# Patient Record
Sex: FEMALE | Race: WHITE | NOT HISPANIC OR LATINO | Employment: FULL TIME | URBAN - METROPOLITAN AREA
[De-identification: names, ages, dates, MRNs, and addresses within clinical notes are randomized per-mention and may not be internally consistent; named-entity substitution may affect disease eponyms.]

---

## 2018-08-20 ENCOUNTER — HOSPITAL ENCOUNTER (OUTPATIENT)
Facility: CLINIC | Age: 48
Discharge: HOME | End: 2018-08-20
Attending: COLON & RECTAL SURGERY | Admitting: COLON & RECTAL SURGERY
Payer: COMMERCIAL

## 2018-08-20 VITALS
RESPIRATION RATE: 14 BRPM | WEIGHT: 144 LBS | DIASTOLIC BLOOD PRESSURE: 55 MMHG | BODY MASS INDEX: 25.52 KG/M2 | HEIGHT: 63 IN | OXYGEN SATURATION: 100 % | SYSTOLIC BLOOD PRESSURE: 99 MMHG

## 2018-08-20 LAB — COLONOSCOPY: NORMAL

## 2018-08-20 PROCEDURE — 25000128 H RX IP 250 OP 636: Performed by: COLON & RECTAL SURGERY

## 2018-08-20 PROCEDURE — 45385 COLONOSCOPY W/LESION REMOVAL: CPT | Mod: PT | Performed by: COLON & RECTAL SURGERY

## 2018-08-20 PROCEDURE — 88305 TISSUE EXAM BY PATHOLOGIST: CPT | Mod: 26 | Performed by: COLON & RECTAL SURGERY

## 2018-08-20 PROCEDURE — 99153 MOD SED SAME PHYS/QHP EA: CPT | Performed by: COLON & RECTAL SURGERY

## 2018-08-20 PROCEDURE — G0500 MOD SEDAT ENDO SERVICE >5YRS: HCPCS | Performed by: COLON & RECTAL SURGERY

## 2018-08-20 PROCEDURE — 88305 TISSUE EXAM BY PATHOLOGIST: CPT | Performed by: COLON & RECTAL SURGERY

## 2018-08-20 RX ORDER — FENTANYL CITRATE 50 UG/ML
INJECTION, SOLUTION INTRAMUSCULAR; INTRAVENOUS PRN
Status: DISCONTINUED | OUTPATIENT
Start: 2018-08-20 | End: 2018-08-20 | Stop reason: HOSPADM

## 2018-08-20 RX ORDER — FLUMAZENIL 0.1 MG/ML
0.2 INJECTION, SOLUTION INTRAVENOUS
Status: DISCONTINUED | OUTPATIENT
Start: 2018-08-20 | End: 2018-08-20 | Stop reason: HOSPADM

## 2018-08-20 RX ORDER — ONDANSETRON 2 MG/ML
4 INJECTION INTRAMUSCULAR; INTRAVENOUS
Status: DISCONTINUED | OUTPATIENT
Start: 2018-08-20 | End: 2018-08-20 | Stop reason: HOSPADM

## 2018-08-20 RX ORDER — ONDANSETRON 2 MG/ML
4 INJECTION INTRAMUSCULAR; INTRAVENOUS EVERY 6 HOURS PRN
Status: DISCONTINUED | OUTPATIENT
Start: 2018-08-20 | End: 2018-08-20 | Stop reason: HOSPADM

## 2018-08-20 RX ORDER — ONDANSETRON 4 MG/1
4 TABLET, ORALLY DISINTEGRATING ORAL EVERY 6 HOURS PRN
Status: DISCONTINUED | OUTPATIENT
Start: 2018-08-20 | End: 2018-08-20 | Stop reason: HOSPADM

## 2018-08-20 RX ORDER — LIDOCAINE 40 MG/G
CREAM TOPICAL
Status: DISCONTINUED | OUTPATIENT
Start: 2018-08-20 | End: 2018-08-20 | Stop reason: HOSPADM

## 2018-08-20 RX ORDER — NALOXONE HYDROCHLORIDE 0.4 MG/ML
.1-.4 INJECTION, SOLUTION INTRAMUSCULAR; INTRAVENOUS; SUBCUTANEOUS
Status: DISCONTINUED | OUTPATIENT
Start: 2018-08-20 | End: 2018-08-20 | Stop reason: HOSPADM

## 2018-08-20 NOTE — DISCHARGE INSTRUCTIONS
Understanding Colon and Rectal Polyps     The colon has a smooth lining composed of millions of cells.     The colon (also called the large intestine) is a muscular tube that forms the last part of the digestive tract. It absorbs water and stores food waste. The colon is about 4 to 6 feet long. The rectum is the last 6 inches of the colon. The colon and rectum have a smooth lining composed of millions of cells. Changes in these cells can lead to growths in the colon that can become cancerous and should be removed.     When the Colon Lining Changes  Changes that occur in the cells that line the colon or rectum can lead to growths called polyps. Over a period of years, polyps can turn cancerous. Removing polyps early may prevent cancer from ever forming.      Polyps  Polyps are fleshy clumps of tissue that form on the lining of the colon or rectum. Small polyps are usually benign (not cancerous). However, over time, cells in a polyp can change and become cancerous. The larger a polyp grows, the more likely this is to happen. Also, certain types of polyps known as adenomatous polyps are considered premalignant. This means that they will almost always become cancerous if they re not removed.          Cancer  Almost all colorectal cancers start when polyp cells begin growing abnormally. As a cancerous tumor grows, it may involve more and more of the colon or rectum. In time, cancer can also grow beyond the colon or rectum and spread to nearby organs or to glands called lymph nodes. The cells can also travel to other parts of the body. This is known as metastasis. The earlier a cancerous tumor is removed, the better the chance of preventing its spread.        5211-0735 [facility], [address] [place] [address], [place] [address]. All rights reserved. This information is not intended as a substitute for professional medical care. Always follow your healthcare professional's instructions.

## 2018-08-20 NOTE — H&P
Pre-Endoscopy History and Physical     Olga Colvin MRN# [id number]   YOB: 1970 Age: 48 year old     Date of Procedure: 8/20/2018  Primary care provider: Rene Smtih  Type of Endoscopy: colonoscopy  Reason for Procedure: screening  Type of Anesthesia Anticipated: Moderate Sedation    HPI:    Olga is a 48 year old female who will be undergoing the above procedure.      A history and physical has been performed. The patient's medications and allergies have been reviewed. The risks and benefits of the procedure and the sedation options and risks were discussed with the patient.  All questions were answered and informed consent was obtained.      She denies a personal or family history of anesthesia complications or bleeding disorders.     Allergies   Allergen Reactions     Codeine Camsylate Other (See Comments)     Agitation/aggression     Nsaids         Prior to Admission Medications   Prescriptions Last Dose Informant Patient Reported? Taking?   DULoxetine (CYMBALTA) 60 MG capsule Past Week at Unknown time  Yes Yes   Sig: Take 120 mg by mouth daily.   TRAZODONE HCL PO 8/19/2018  Yes Yes   Sig: Take 150 mg by mouth At Bedtime   ValACYclovir (VALTREX) 1 GM tablet Past Month at Unknown time  Yes Yes   Sig: Take 1,000 mg by mouth as needed.      Facility-Administered Medications: None       Patient Active Problem List   Diagnosis     Dysfunctional uterine bleeding     Endometrial mass     Endometrial polyp        Past Medical History:   Diagnosis Date     PONV (postoperative nausea and vomiting)         Past Surgical History:   Procedure Laterality Date     APPENDECTOMY       C GASTRIC BYPASS,OBESE<100CM FLAQUITO-EN-Y       CHOLECYSTECTOMY       COLONOSCOPY  08/20/2018    Dr. Gogo ALTMAN     DILATION AND CURETTAGE, HYSTEROSCOPY DIAGNOSTIC, COMBINED  10/28/2011    Procedure:COMBINED DILATION AND CURETTAGE, HYSTEROSCOPY DIAGNOSTIC; Hysteroscopy with Dilation and Curettage ; Surgeon:GERARDO AGUAYO  "CHARITY; Location:[facility]     ENT SURGERY      upper and lower jaw     GYN SURGERY      LEEP     HC REDUCTION OF LARGE BREAST         Social History   Substance Use Topics     Smoking status: Never Smoker     Smokeless tobacco: Never Used     Alcohol use Yes      Comment: rarely       Family History   Problem Relation Age of Onset     Colon Cancer No family hx of        REVIEW OF SYSTEMS:     5 point ROS negative except as noted above in HPI, including Gen., Resp., CV, GI &  system review.      PHYSICAL EXAM:   There were no vitals taken for this visit. Estimated body mass index is 31.75 kg/(m^2) as calculated from the following:    Height as of 10/28/11: 1.6 m (5' 3\").    Weight as of 10/28/11: 81.3 kg (179 lb 3.7 oz).   GENERAL APPEARANCE: healthy and alert  MENTAL STATUS: alert  AIRWAY EXAM: Mallampatti Class I (visualization of the soft palate, fauces, uvula, anterior and posterior pillars)  RESP: lungs clear to auscultation - no rales, rhonchi or wheezes  CV: regular rates and rhythm      DIAGNOSTICS:    Not indicated      IMPRESSION   ASA Class 1 - Healthy patient, no medical problems        PLAN:       Plan for colonoscopy. We discussed the risks, benefits and alternatives and the patient wished to proceed.    The above has been forwarded to the consulting provider.      Signed Electronically by: Chaya Bowens MD  August 20, 2018    "

## 2018-08-20 NOTE — IP AVS SNAPSHOT
MRN:[id number]                      After Visit Summary   8/20/2018    Olga Colvin    MRN: [id number]           Thank you!     Thank you for choosing [facility] for your care. Our goal is always to provide you with excellent care. Hearing back from our patients is one way we can continue to improve our services. Please take a few minutes to complete the written survey that you may receive in the mail after you visit. If you would like to speak to someone directly about your visit please contact Patient Relations at [phone]. Thank you!          Patient Information     Date Of Birth          1970        About your hospital stay     You were admitted on:  August 20, 2018 You last received care in the:  [facility]    You were discharged on:  August 20, 2018       Who to Call     For medical emergencies, please call 911.  For non-urgent questions about your medical care, please call your primary care provider or clinic, [phone]  For questions related to your surgery, please call your surgery clinic        Attending Provider     Provider Specialty    Chaya Bowens MD Colon and Rectal Surgery       Primary Care Provider Office Phone # Fax #    Rene Smith [phone]      Further instructions from your care team         Understanding Colon and Rectal Polyps     The colon has a smooth lining composed of millions of cells.     The colon (also called the large intestine) is a muscular tube that forms the last part of the digestive tract. It absorbs water and stores food waste. The colon is about 4 to 6 feet long. The rectum is the last 6 inches of the colon. The colon and rectum have a smooth lining composed of millions of cells. Changes in these cells can lead to growths in the colon that can become cancerous and should be removed.     When the Colon Lining Changes  Changes that occur in the cells that line the colon or rectum can  "lead to growths called polyps. Over a period of years, polyps can turn cancerous. Removing polyps early may prevent cancer from ever forming.      Polyps  Polyps are fleshy clumps of tissue that form on the lining of the colon or rectum. Small polyps are usually benign (not cancerous). However, over time, cells in a polyp can change and become cancerous. The larger a polyp grows, the more likely this is to happen. Also, certain types of polyps known as adenomatous polyps are considered premalignant. This means that they will almost always become cancerous if they re not removed.          Cancer  Almost all colorectal cancers start when polyp cells begin growing abnormally. As a cancerous tumor grows, it may involve more and more of the colon or rectum. In time, cancer can also grow beyond the colon or rectum and spread to nearby organs or to glands called lymph nodes. The cells can also travel to other parts of the body. This is known as metastasis. The earlier a cancerous tumor is removed, the better the chance of preventing its spread.        8443-0327 [place] [address]. All rights reserved. This information is not intended as a substitute for professional medical care. Always follow your healthcare professional's instructions.    Pending Results     No orders found from 8/18/2018 to 8/21/2018.            Admission Information     Date & Time Provider Department Dept. Phone    8/20/2018 Chaya Bowens MD [facility] [phone]      Your Vitals Were     Blood Pressure Respirations Height Weight Pulse Oximetry BMI (Body Mass Index)    100/57 19 1.6 m (5' 3\") 65.3 kg (144 lb) 100% 25.51 kg/m2      MyChart Information     [employer] lets you send messages to your doctor, view your test results, renew your prescriptions, schedule appointments and more. To sign up, go to [email or web address] . Click on \"Log in\" on the left side of the screen, which will " "take you to the Welcome page. Then click on \"Sign up Now\" on the right side of the page.     You will be asked to enter the access code listed below, as well as some personal information. Please follow the directions to create your username and password.     Your access code is: [email or web address]  Expires: 2018  9:21 AM     Your access code will  in 90 days. If you need help or a new code, please call your [facility] clinic or [phone].        Care EveryWhere ID     This is your Care EveryWhere ID. This could be used by other organizations to access your [facility] medical records  [id number]        Equal Access to Services     ALEXIS ROQUE : Ruben Soto, jeffery dominguez, camila mcclellan, erin guerrero. So [facility] [phone].    ATENCIÓN: Si habla español, tiene a fung disposición servicios gratuitos de asistencia lingüística. Llame al [phone].    We comply with applicable federal civil rights laws and [place] laws. We do not discriminate on the basis of race, color, national origin, age, disability, sex, sexual orientation, or gender identity.               Review of your medicines      CONTINUE these medicines which have NOT CHANGED        Dose / Directions    CYMBALTA 60 MG EC capsule   Generic drug:  DULoxetine        Dose:  120 mg   Take 120 mg by mouth daily.   Refills:  0       TRAZODONE HCL PO        Dose:  150 mg   Take 150 mg by mouth At Bedtime   Refills:  0       VALTREX 1000 mg tablet   Generic drug:  valACYclovir        Dose:  1000 mg   Take 1,000 mg by mouth as needed.   Refills:  0                Protect others around you: Learn how to safely use, store and throw away your medicines at www.disposemymeds.org.             Medication List: This is a list of all your medications and when to take them. Check marks below indicate your daily home schedule. Keep this list as a reference.      Medications           Morning Afternoon Evening " Bedtime As Needed    CYMBALTA 60 MG EC capsule   Take 120 mg by mouth daily.   Generic drug:  DULoxetine                                TRAZODONE HCL PO   Take 150 mg by mouth At Bedtime                                VALTREX 1000 mg tablet   Take 1,000 mg by mouth as needed.   Generic drug:  valACYclovir

## 2018-08-20 NOTE — OP NOTE
See Provation Note In Chart    Chaya Bowens MD  [facility]  Office Phone # [phone]

## 2018-08-21 LAB — COPATH REPORT: NORMAL

## 2019-06-24 PROBLEM — N83.202 LEFT OVARIAN CYST: Status: ACTIVE | Noted: 2019-06-24

## 2019-06-27 RX ORDER — CALCIUM CARBONATE 500(1250)
1 TABLET ORAL DAILY
Status: ON HOLD | COMMUNITY
End: 2023-12-12

## 2019-06-27 RX ORDER — LANOLIN ALCOHOL/MO/W.PET/CERES
CREAM (GRAM) TOPICAL DAILY
COMMUNITY

## 2019-06-27 RX ORDER — LANOLIN ALCOHOL/MO/W.PET/CERES
400 CREAM (GRAM) TOPICAL DAILY
Status: ON HOLD | COMMUNITY
End: 2023-12-12

## 2019-06-28 ENCOUNTER — ANESTHESIA EVENT (OUTPATIENT)
Dept: SURGERY | Facility: CLINIC | Age: 49
End: 2019-06-28
Payer: COMMERCIAL

## 2019-06-28 ENCOUNTER — HOSPITAL ENCOUNTER (OUTPATIENT)
Facility: CLINIC | Age: 49
Discharge: HOME | End: 2019-06-28
Attending: OBSTETRICS & GYNECOLOGY | Admitting: OBSTETRICS & GYNECOLOGY
Payer: COMMERCIAL

## 2019-06-28 ENCOUNTER — ANESTHESIA (OUTPATIENT)
Dept: SURGERY | Facility: CLINIC | Age: 49
End: 2019-06-28
Payer: COMMERCIAL

## 2019-06-28 VITALS
WEIGHT: 162.8 LBS | HEART RATE: 64 BPM | BODY MASS INDEX: 28.84 KG/M2 | RESPIRATION RATE: 18 BRPM | SYSTOLIC BLOOD PRESSURE: 118 MMHG | DIASTOLIC BLOOD PRESSURE: 68 MMHG | TEMPERATURE: 96.1 F | OXYGEN SATURATION: 97 % | HEIGHT: 63 IN

## 2019-06-28 DIAGNOSIS — N83.202 LEFT OVARIAN CYST: Primary | ICD-10-CM

## 2019-06-28 LAB
ABO + RH BLD: NORMAL
ABO + RH BLD: NORMAL
B-HCG SERPL-ACNC: <1 IU/L (ref 0–5)
BASOPHILS # BLD AUTO: 0 10E9/L (ref 0–0.2)
BASOPHILS NFR BLD AUTO: 0.3 %
BLD GP AB SCN SERPL QL: NORMAL
BLOOD BANK CMNT PATIENT-IMP: NORMAL
DIFFERENTIAL METHOD BLD: ABNORMAL
EOSINOPHIL # BLD AUTO: 0.1 10E9/L (ref 0–0.7)
EOSINOPHIL NFR BLD AUTO: 2.3 %
ERYTHROCYTE [DISTWIDTH] IN BLOOD BY AUTOMATED COUNT: 13.5 % (ref 10–15)
HCT VFR BLD AUTO: 35.6 % (ref 35–47)
HGB BLD-MCNC: 11.8 G/DL (ref 11.7–15.7)
IMM GRANULOCYTES # BLD: 0 10E9/L (ref 0–0.4)
IMM GRANULOCYTES NFR BLD: 0 %
LYMPHOCYTES # BLD AUTO: 1.6 10E9/L (ref 0.8–5.3)
LYMPHOCYTES NFR BLD AUTO: 40.6 %
MCH RBC QN AUTO: 30.6 PG (ref 26.5–33)
MCHC RBC AUTO-ENTMCNC: 33.1 G/DL (ref 31.5–36.5)
MCV RBC AUTO: 93 FL (ref 78–100)
MONOCYTES # BLD AUTO: 0.4 10E9/L (ref 0–1.3)
MONOCYTES NFR BLD AUTO: 9.6 %
NEUTROPHILS # BLD AUTO: 1.8 10E9/L (ref 1.6–8.3)
NEUTROPHILS NFR BLD AUTO: 47.2 %
NRBC # BLD AUTO: 0 10*3/UL
NRBC BLD AUTO-RTO: 0 /100
PLATELET # BLD AUTO: 210 10E9/L (ref 150–450)
RBC # BLD AUTO: 3.85 10E12/L (ref 3.8–5.2)
SPECIMEN EXP DATE BLD: NORMAL
WBC # BLD AUTO: 3.8 10E9/L (ref 4–11)

## 2019-06-28 PROCEDURE — 25000128 H RX IP 250 OP 636: Performed by: OBSTETRICS & GYNECOLOGY

## 2019-06-28 PROCEDURE — 36000058 ZZH SURGERY LEVEL 3 EA 15 ADDTL MIN: Performed by: OBSTETRICS & GYNECOLOGY

## 2019-06-28 PROCEDURE — 71000013 ZZH RECOVERY PHASE 1 LEVEL 1 EA ADDTL HR: Performed by: OBSTETRICS & GYNECOLOGY

## 2019-06-28 PROCEDURE — 37000008 ZZH ANESTHESIA TECHNICAL FEE, 1ST 30 MIN: Performed by: OBSTETRICS & GYNECOLOGY

## 2019-06-28 PROCEDURE — 25800025 ZZH RX 258: Performed by: OBSTETRICS & GYNECOLOGY

## 2019-06-28 PROCEDURE — 88305 TISSUE EXAM BY PATHOLOGIST: CPT | Mod: 26 | Performed by: OBSTETRICS & GYNECOLOGY

## 2019-06-28 PROCEDURE — 86850 RBC ANTIBODY SCREEN: CPT | Performed by: OBSTETRICS & GYNECOLOGY

## 2019-06-28 PROCEDURE — 25000128 H RX IP 250 OP 636: Performed by: NURSE ANESTHETIST, CERTIFIED REGISTERED

## 2019-06-28 PROCEDURE — 25000125 ZZHC RX 250: Performed by: NURSE ANESTHETIST, CERTIFIED REGISTERED

## 2019-06-28 PROCEDURE — 86900 BLOOD TYPING SEROLOGIC ABO: CPT | Performed by: OBSTETRICS & GYNECOLOGY

## 2019-06-28 PROCEDURE — 84702 CHORIONIC GONADOTROPIN TEST: CPT | Performed by: OBSTETRICS & GYNECOLOGY

## 2019-06-28 PROCEDURE — 25800030 ZZH RX IP 258 OP 636: Performed by: NURSE ANESTHETIST, CERTIFIED REGISTERED

## 2019-06-28 PROCEDURE — 86901 BLOOD TYPING SEROLOGIC RH(D): CPT | Performed by: OBSTETRICS & GYNECOLOGY

## 2019-06-28 PROCEDURE — 71000012 ZZH RECOVERY PHASE 1 LEVEL 1 FIRST HR: Performed by: OBSTETRICS & GYNECOLOGY

## 2019-06-28 PROCEDURE — 25000566 ZZH SEVOFLURANE, EA 15 MIN: Performed by: OBSTETRICS & GYNECOLOGY

## 2019-06-28 PROCEDURE — 88305 TISSUE EXAM BY PATHOLOGIST: CPT | Performed by: OBSTETRICS & GYNECOLOGY

## 2019-06-28 PROCEDURE — 85025 COMPLETE CBC W/AUTO DIFF WBC: CPT | Performed by: OBSTETRICS & GYNECOLOGY

## 2019-06-28 PROCEDURE — 27210794 ZZH OR GENERAL SUPPLY STERILE: Performed by: OBSTETRICS & GYNECOLOGY

## 2019-06-28 PROCEDURE — 36415 COLL VENOUS BLD VENIPUNCTURE: CPT | Performed by: OBSTETRICS & GYNECOLOGY

## 2019-06-28 PROCEDURE — 25000125 ZZHC RX 250: Performed by: OBSTETRICS & GYNECOLOGY

## 2019-06-28 PROCEDURE — 36000056 ZZH SURGERY LEVEL 3 1ST 30 MIN: Performed by: OBSTETRICS & GYNECOLOGY

## 2019-06-28 PROCEDURE — 37000009 ZZH ANESTHESIA TECHNICAL FEE, EACH ADDTL 15 MIN: Performed by: OBSTETRICS & GYNECOLOGY

## 2019-06-28 PROCEDURE — 40000169 ZZH STATISTIC PRE-PROCEDURE ASSESSMENT I: Performed by: OBSTETRICS & GYNECOLOGY

## 2019-06-28 PROCEDURE — 25000132 ZZH RX MED GY IP 250 OP 250 PS 637: Performed by: OBSTETRICS & GYNECOLOGY

## 2019-06-28 PROCEDURE — 71000027 ZZH RECOVERY PHASE 2 EACH 15 MINS: Performed by: OBSTETRICS & GYNECOLOGY

## 2019-06-28 RX ORDER — BUPIVACAINE HYDROCHLORIDE 5 MG/ML
INJECTION, SOLUTION PERINEURAL PRN
Status: DISCONTINUED | OUTPATIENT
Start: 2019-06-28 | End: 2019-06-28 | Stop reason: HOSPADM

## 2019-06-28 RX ORDER — MAGNESIUM HYDROXIDE 1200 MG/15ML
LIQUID ORAL PRN
Status: DISCONTINUED | OUTPATIENT
Start: 2019-06-28 | End: 2019-06-28 | Stop reason: HOSPADM

## 2019-06-28 RX ORDER — GLYCOPYRROLATE 0.2 MG/ML
INJECTION, SOLUTION INTRAMUSCULAR; INTRAVENOUS PRN
Status: DISCONTINUED | OUTPATIENT
Start: 2019-06-28 | End: 2019-06-28

## 2019-06-28 RX ORDER — ALBUTEROL SULFATE 0.83 MG/ML
2.5 SOLUTION RESPIRATORY (INHALATION) EVERY 4 HOURS PRN
Status: DISCONTINUED | OUTPATIENT
Start: 2019-06-28 | End: 2019-06-28 | Stop reason: HOSPADM

## 2019-06-28 RX ORDER — ONDANSETRON 2 MG/ML
INJECTION INTRAMUSCULAR; INTRAVENOUS PRN
Status: DISCONTINUED | OUTPATIENT
Start: 2019-06-28 | End: 2019-06-28

## 2019-06-28 RX ORDER — SODIUM CHLORIDE, SODIUM LACTATE, POTASSIUM CHLORIDE, CALCIUM CHLORIDE 600; 310; 30; 20 MG/100ML; MG/100ML; MG/100ML; MG/100ML
INJECTION, SOLUTION INTRAVENOUS CONTINUOUS
Status: DISCONTINUED | OUTPATIENT
Start: 2019-06-28 | End: 2019-06-28 | Stop reason: HOSPADM

## 2019-06-28 RX ORDER — SODIUM CHLORIDE, SODIUM LACTATE, POTASSIUM CHLORIDE, CALCIUM CHLORIDE 600; 310; 30; 20 MG/100ML; MG/100ML; MG/100ML; MG/100ML
INJECTION, SOLUTION INTRAVENOUS CONTINUOUS PRN
Status: DISCONTINUED | OUTPATIENT
Start: 2019-06-28 | End: 2019-06-28

## 2019-06-28 RX ORDER — PROPOFOL 10 MG/ML
INJECTION, EMULSION INTRAVENOUS PRN
Status: DISCONTINUED | OUTPATIENT
Start: 2019-06-28 | End: 2019-06-28

## 2019-06-28 RX ORDER — FENTANYL CITRATE 50 UG/ML
INJECTION, SOLUTION INTRAMUSCULAR; INTRAVENOUS PRN
Status: DISCONTINUED | OUTPATIENT
Start: 2019-06-28 | End: 2019-06-28

## 2019-06-28 RX ORDER — KETOROLAC TROMETHAMINE 30 MG/ML
30 INJECTION, SOLUTION INTRAMUSCULAR; INTRAVENOUS ONCE
Status: COMPLETED | OUTPATIENT
Start: 2019-06-28 | End: 2019-06-28

## 2019-06-28 RX ORDER — CEFAZOLIN SODIUM 2 G/100ML
2 INJECTION, SOLUTION INTRAVENOUS
Status: COMPLETED | OUTPATIENT
Start: 2019-06-28 | End: 2019-06-28

## 2019-06-28 RX ORDER — CEFAZOLIN SODIUM 1 G/3ML
1 INJECTION, POWDER, FOR SOLUTION INTRAMUSCULAR; INTRAVENOUS SEE ADMIN INSTRUCTIONS
Status: DISCONTINUED | OUTPATIENT
Start: 2019-06-28 | End: 2019-06-28 | Stop reason: HOSPADM

## 2019-06-28 RX ORDER — NEOSTIGMINE METHYLSULFATE 1 MG/ML
VIAL (ML) INJECTION PRN
Status: DISCONTINUED | OUTPATIENT
Start: 2019-06-28 | End: 2019-06-28

## 2019-06-28 RX ORDER — ACETAMINOPHEN 650 MG
TABLET, EXTENDED RELEASE ORAL PRN
Status: DISCONTINUED | OUTPATIENT
Start: 2019-06-28 | End: 2019-06-28 | Stop reason: HOSPADM

## 2019-06-28 RX ORDER — ONDANSETRON 4 MG/1
4 TABLET, ORALLY DISINTEGRATING ORAL EVERY 30 MIN PRN
Status: DISCONTINUED | OUTPATIENT
Start: 2019-06-28 | End: 2019-06-28 | Stop reason: HOSPADM

## 2019-06-28 RX ORDER — ACETAMINOPHEN 325 MG/1
650 TABLET ORAL
Status: DISCONTINUED | OUTPATIENT
Start: 2019-06-28 | End: 2019-06-28 | Stop reason: HOSPADM

## 2019-06-28 RX ORDER — PROPOFOL 10 MG/ML
INJECTION, EMULSION INTRAVENOUS CONTINUOUS PRN
Status: DISCONTINUED | OUTPATIENT
Start: 2019-06-28 | End: 2019-06-28

## 2019-06-28 RX ORDER — FENTANYL CITRATE 50 UG/ML
25-50 INJECTION, SOLUTION INTRAMUSCULAR; INTRAVENOUS
Status: DISCONTINUED | OUTPATIENT
Start: 2019-06-28 | End: 2019-06-28 | Stop reason: HOSPADM

## 2019-06-28 RX ORDER — ONDANSETRON 2 MG/ML
4 INJECTION INTRAMUSCULAR; INTRAVENOUS EVERY 30 MIN PRN
Status: DISCONTINUED | OUTPATIENT
Start: 2019-06-28 | End: 2019-06-28 | Stop reason: HOSPADM

## 2019-06-28 RX ORDER — HYDROMORPHONE HYDROCHLORIDE 1 MG/ML
.3-.5 INJECTION, SOLUTION INTRAMUSCULAR; INTRAVENOUS; SUBCUTANEOUS EVERY 10 MIN PRN
Status: DISCONTINUED | OUTPATIENT
Start: 2019-06-28 | End: 2019-06-28 | Stop reason: HOSPADM

## 2019-06-28 RX ORDER — DEXAMETHASONE SODIUM PHOSPHATE 4 MG/ML
INJECTION, SOLUTION INTRA-ARTICULAR; INTRALESIONAL; INTRAMUSCULAR; INTRAVENOUS; SOFT TISSUE PRN
Status: DISCONTINUED | OUTPATIENT
Start: 2019-06-28 | End: 2019-06-28

## 2019-06-28 RX ORDER — BUPIVACAINE HYDROCHLORIDE 5 MG/ML
INJECTION, SOLUTION EPIDURAL; INTRACAUDAL
Status: DISCONTINUED
Start: 2019-06-28 | End: 2019-06-28 | Stop reason: HOSPADM

## 2019-06-28 RX ORDER — NALOXONE HYDROCHLORIDE 0.4 MG/ML
.1-.4 INJECTION, SOLUTION INTRAMUSCULAR; INTRAVENOUS; SUBCUTANEOUS
Status: DISCONTINUED | OUTPATIENT
Start: 2019-06-28 | End: 2019-06-28 | Stop reason: HOSPADM

## 2019-06-28 RX ORDER — HYDRALAZINE HYDROCHLORIDE 20 MG/ML
2.5-5 INJECTION INTRAMUSCULAR; INTRAVENOUS EVERY 10 MIN PRN
Status: DISCONTINUED | OUTPATIENT
Start: 2019-06-28 | End: 2019-06-28 | Stop reason: HOSPADM

## 2019-06-28 RX ORDER — LABETALOL HYDROCHLORIDE 5 MG/ML
10 INJECTION, SOLUTION INTRAVENOUS
Status: DISCONTINUED | OUTPATIENT
Start: 2019-06-28 | End: 2019-06-28 | Stop reason: HOSPADM

## 2019-06-28 RX ORDER — OXYCODONE HYDROCHLORIDE 5 MG/1
5-10 TABLET ORAL
Status: DISCONTINUED | OUTPATIENT
Start: 2019-06-28 | End: 2019-06-28 | Stop reason: HOSPADM

## 2019-06-28 RX ORDER — ACETAMINOPHEN 325 MG/1
975 TABLET ORAL ONCE
Status: COMPLETED | OUTPATIENT
Start: 2019-06-28 | End: 2019-06-28

## 2019-06-28 RX ORDER — LIDOCAINE HYDROCHLORIDE 20 MG/ML
INJECTION, SOLUTION INFILTRATION; PERINEURAL PRN
Status: DISCONTINUED | OUTPATIENT
Start: 2019-06-28 | End: 2019-06-28

## 2019-06-28 RX ORDER — MEPERIDINE HYDROCHLORIDE 25 MG/ML
12.5 INJECTION INTRAMUSCULAR; INTRAVENOUS; SUBCUTANEOUS
Status: DISCONTINUED | OUTPATIENT
Start: 2019-06-28 | End: 2019-06-28 | Stop reason: HOSPADM

## 2019-06-28 RX ORDER — OXYCODONE HYDROCHLORIDE 5 MG/1
5-10 TABLET ORAL EVERY 4 HOURS PRN
Qty: 16 TABLET | Refills: 0 | Status: ON HOLD | OUTPATIENT
Start: 2019-06-28 | End: 2023-12-12

## 2019-06-28 RX ADMIN — FENTANYL CITRATE 100 MCG: 50 INJECTION, SOLUTION INTRAMUSCULAR; INTRAVENOUS at 07:44

## 2019-06-28 RX ADMIN — PHENYLEPHRINE HYDROCHLORIDE 100 MCG: 10 INJECTION INTRAVENOUS at 07:56

## 2019-06-28 RX ADMIN — ACETAMINOPHEN 975 MG: 325 TABLET, FILM COATED ORAL at 07:02

## 2019-06-28 RX ADMIN — PHENYLEPHRINE HYDROCHLORIDE 100 MCG: 10 INJECTION INTRAVENOUS at 09:04

## 2019-06-28 RX ADMIN — GLYCOPYRROLATE 0.6 MG: 0.2 INJECTION, SOLUTION INTRAMUSCULAR; INTRAVENOUS at 08:57

## 2019-06-28 RX ADMIN — SODIUM CHLORIDE, POTASSIUM CHLORIDE, SODIUM LACTATE AND CALCIUM CHLORIDE: 600; 310; 30; 20 INJECTION, SOLUTION INTRAVENOUS at 08:56

## 2019-06-28 RX ADMIN — KETOROLAC TROMETHAMINE 30 MG: 30 INJECTION, SOLUTION INTRAMUSCULAR at 09:37

## 2019-06-28 RX ADMIN — PROPOFOL 150 MCG/KG/MIN: 10 INJECTION, EMULSION INTRAVENOUS at 07:44

## 2019-06-28 RX ADMIN — PROPOFOL 200 MG: 10 INJECTION, EMULSION INTRAVENOUS at 07:44

## 2019-06-28 RX ADMIN — MIDAZOLAM 2 MG: 1 INJECTION INTRAMUSCULAR; INTRAVENOUS at 07:39

## 2019-06-28 RX ADMIN — LIDOCAINE HYDROCHLORIDE 80 MG: 20 INJECTION, SOLUTION INFILTRATION; PERINEURAL at 07:44

## 2019-06-28 RX ADMIN — CEFAZOLIN SODIUM 2 G: 2 INJECTION, SOLUTION INTRAVENOUS at 07:50

## 2019-06-28 RX ADMIN — ROCURONIUM BROMIDE 10 MG: 10 INJECTION INTRAVENOUS at 08:07

## 2019-06-28 RX ADMIN — GLYCOPYRROLATE 0.2 MG: 0.2 INJECTION, SOLUTION INTRAMUSCULAR; INTRAVENOUS at 08:02

## 2019-06-28 RX ADMIN — ROCURONIUM BROMIDE 10 MG: 10 INJECTION INTRAVENOUS at 08:20

## 2019-06-28 RX ADMIN — NEOSTIGMINE METHYLSULFATE 4 MG: 1 INJECTION, SOLUTION INTRAVENOUS at 08:57

## 2019-06-28 RX ADMIN — DEXAMETHASONE SODIUM PHOSPHATE 8 MG: 4 INJECTION, SOLUTION INTRA-ARTICULAR; INTRALESIONAL; INTRAMUSCULAR; INTRAVENOUS; SOFT TISSUE at 07:53

## 2019-06-28 RX ADMIN — SODIUM CHLORIDE, POTASSIUM CHLORIDE, SODIUM LACTATE AND CALCIUM CHLORIDE: 600; 310; 30; 20 INJECTION, SOLUTION INTRAVENOUS at 07:39

## 2019-06-28 RX ADMIN — ONDANSETRON 4 MG: 2 INJECTION INTRAMUSCULAR; INTRAVENOUS at 08:46

## 2019-06-28 RX ADMIN — DEXMEDETOMIDINE HYDROCHLORIDE 8 MCG: 100 INJECTION, SOLUTION INTRAVENOUS at 08:15

## 2019-06-28 RX ADMIN — ROCURONIUM BROMIDE 40 MG: 10 INJECTION INTRAVENOUS at 07:44

## 2019-06-28 ASSESSMENT — MIFFLIN-ST. JEOR: SCORE: 1322.59

## 2019-06-28 NOTE — ANESTHESIA CARE TRANSFER NOTE
Patient: Olga Colvin    Procedure(s):  HYSTEROSCOPY, WITH DILATATION AND CURETTAGE  LAPAROSCOPIC BILATERAL SALPINGO-OOPHORECTOMIES    Diagnosis: THICKENED ENDOMETRIAL STRIPE ; LEFT COMPLEX OVARIAN CYST  Diagnosis Additional Information: No value filed.    Anesthesia Type:   General, ETT     Note:  Airway :Face Mask  Patient transferred to:PACU  Comments: Transferred to PACU, spontaneous respirations, 10L oxygen via facemask.  All monitors and alarms on and functioning, VSS.  Patient awake, comfortable.  Report to PACU RN.Handoff Report: Identifed the Patient, Identified the Reponsible Provider, Reviewed the pertinent medical history, Discussed the surgical course, Reviewed Intra-OP anesthesia mangement and issues during anesthesia, Set expectations for post-procedure period and Allowed opportunity for questions and acknowledgement of understanding      Vitals: (Last set prior to Anesthesia Care Transfer)    CRNA VITALS  6/28/2019 0853 - 6/28/2019 0931      6/28/2019             Pulse:  85    SpO2:  88 %  (Abnormal)     Resp Rate (set):  10                Electronically Signed By: NIALL Hernandes CRNA  June 28, 2019  9:31 AM

## 2019-06-28 NOTE — DISCHARGE INSTRUCTIONS
Same Day Surgery Discharge Instructions for  Sedation and General Anesthesia       It's not unusual to feel dizzy, light-headed or faint for up to 24 hours after surgery or while taking pain medication.  If you have these symptoms: sit for a few minutes before standing and have someone assist you when you get up to walk or use the bathroom.      You should rest and relax for the next 24 hours. We recommend you make arrangements to have an adult stay with you for at least 24 hours after your discharge.  Avoid hazardous and strenuous activity.      DO NOT DRIVE any vehicle or operate mechanical equipment for 24 hours following the end of your surgery.  Even though you may feel normal, your reactions may be affected by the medication you have received.      Do not drink alcoholic beverages for 24 hours following surgery.       Slowly progress to your regular diet as you feel able. It's not unusual to feel nauseated and/or vomit after receiving anesthesia.  If you develop these symptoms, drink clear liquids (apple juice, ginger ale, broth, 7-up, etc. ) until you feel better.  If your nausea and vomiting persists for 24 hours, please notify your surgeon.        All narcotic pain medications, along with inactivity and anesthesia, can cause constipation. Drinking plenty of liquids and increasing fiber intake will help.      For any questions of a medical nature, call your surgeon.      Do not make important decisions for 24 hours.      If you had general anesthesia, you may have a sore throat for a couple of days related to the breathing tube used during surgery.  You may use Cepacol lozenges to help with this discomfort.  If it worsens or if you develop a fever, contact your surgeon.       If you feel your pain is not well managed with the pain medications prescribed by your surgeon, please contact your surgeon's office to let them know so they can address your concerns.         Today you received Toradol, an  antiinflammatory medication similar to Ibuprofen.  You should not take other antiinflammatory medication, such as Ibuprofen, Motrin, Advil, Aleve, Naprosyn, etc until 3:30pm        HOME CARE FOLLOWING LAPAROSCOPY    Diet  You have no restrictions on your diet.  During the evening following surgery, drink plenty of fluids and eat a light supper.    Nausea  The anesthesia may produce some nausea.  If you feel nauseated, stay in bed and try drinking fluids such as 7-Up, tea, or soup.    Discomfort  The amount of discomfort you can expect is very unpredictable.  If you have pain that cannot be controlled with Tylenol or with the prescription you may have received, you should notify your physician.  The following complaints are not uncommon and should not be cause for concern:   1.  Abdominal tenderness; abdominal cramping   2.  Low backache or pain radiating to your shoulders, chest or back. This is a result of the gas used to inflate your abdomen during surgery. Lying flat in bed seems to help relieve this.   3.  Sore throat for a day or two resulting from the anesthesia tube used during surgery.   4.  Black or blue beatriz on your abdomen.     Drainage  You may expect a small amount of drainage from the incision on your abdomen and you may change the bandage when necessary.  You will also have a small amount of vaginal drainage for several days; this is normal and no cause for concern.  If excessive bleeding occurs, notify your physician.      If dye was used during your procedure, your urine will initially be bright blue. It will gradually return to yellow throughout the day. Drinking plenty of fluids will help to filter the dye from your urine.    Fever  A low grade fever (not over 100 F) is usual after this procedure.  Do not hesitate to notify your physician if your fever seems excessive.    Stitches  If your stitches are the type that must be removed, your doctor will instruct you to return to their  office.    Activity  Rest on the day of surgery then you may resume your normal activity, as tolerated. Avoid heavy lifting for one week.    You may shower.  Do not douche, and do not use tampons.  If you also had a D&C, do not resume intercourse until bleeding has ceased.            Emergency Care  Contact your physician if you have any of these problems:   1.  A fever over 100 F   2.  A large amount of bleeding or drainage   3.  Severe pain    **If you have questions or concerns about your procedure,   call Dr Martin at  [phone]**

## 2019-06-28 NOTE — ANESTHESIA POSTPROCEDURE EVALUATION
Patient: Olga Colvin    Procedure(s):  HYSTEROSCOPY, WITH DILATATION AND CURETTAGE  LAPAROSCOPIC BILATERAL SALPINGO-OOPHORECTOMIES    Diagnosis:THICKENED ENDOMETRIAL STRIPE ; LEFT COMPLEX OVARIAN CYST  Diagnosis Additional Information: No value filed.    Anesthesia Type:  General, ETT    Note:  Anesthesia Post Evaluation    Patient location during evaluation: PACU  Patient participation: Able to fully participate in evaluation  Level of consciousness: awake  Pain management: adequate  Airway patency: patent  Cardiovascular status: acceptable  Respiratory status: acceptable  Hydration status: acceptable  PONV: none     Anesthetic complications: None          Last vitals:  Vitals:    06/28/19 1000 06/28/19 1015 06/28/19 1030   BP: 112/70 116/70 114/69   Pulse:  64 64   Resp: 16 11 17   Temp: 35.2  C (95.4  F) 35.4  C (95.7  F) 35.6  C (96.1  F)   SpO2: 95% 93% 97%         Electronically Signed By: Alva Mooney MD, MD  June 28, 2019  11:17 AM

## 2019-06-28 NOTE — OP NOTE
Olga Colvin    66      DATE OF SURGERY: 2019    PREOP DIAGNOSIS: 1. 3cm left ovarian probable dermoid cyst                   2. Thickened endometrial lining on ultrasound    POSTOP DIAGNOSIS:  Same                        PROCEDURE: Hysteroscopy, dilatation and curettage and laparascopic bilateral salpingoophorectomies.    EBL:  5cc    ANESTHESIA:  General endotracheal anesthesia    SURGEON: Meghan Martin M.D.    ASSISTANT: Carissa Brush CST    FINDINGS: hysteroscopy with normal endometrial cavity and tubal ostia; laparascopy with left 3cm ovarian cyst, normal tubes, 3cm fundal fibroid evident, normal upper abdomen and no sign of endometriosis or adhesions.    PROCEDURE IN DETAIL:      The patient was brought to the operating room where general anesthesia was administered and the patient was placed in the dorsolithotomy position where she was prepped and draped for an abdominal and vaginal procedure.  A timeout was conducted identifying the correct patient and procedure. A mathis catheter was placed.    A speculum was placed and the anterior cervix was grasped with a tenaculum.  The cervix was dilated to a 6 Hegar dilator and the uterus was sounded to  6 cm. The hysteroscope was inserted and a panoramic view of the uterine cavity was obtained with normal findings.  The hysteroscope was removed and an acorn tenaculum attached.    Attention was directed to the patients abdomen where an infraumbilical incision was made.  The veress was placed and passed the sterile drop test.  The abdomen was insufflated following an opening pressure of 3mm.  The abdomen was filled with 4 liters of CO2.  The veress needle was removed and the 5mm optiview trocar was placed and entry was atraumatic by direct visualization.    Two accessory ports of 5mm were placed in the LLQ and RLQ and an 11 mm port in the suprapubic midline location.    Attention was directed to the right tube and ovary and the right ureter was  identified well below; the Ligasure device was used to cauterize and transect the IP vessels and then along the broad ligament and then the right uteroovarian ligament and tube. The same was repeated on the left side with the left ureter being well below and the Ligasure used to go across the left IP, broad ligament and uteroovarian ligament and tube. The 11mm endobag was inserted and both specimens inserted and the bag removed. It was opened up on the outside and the specimens removed with hemostats and Kocher clamps- the left ovary was the last specimen removed and was identified as secretions and fat were present. Following copious irrigation the operative field was hemostatic. The Gary Olivia device was then used to close the 11mm fascial opening with 0 vicryl. There were no complications. The CO2 gas was allow to escape.  The accessory ports were removed under direct visualization.  The laparascope and its sheath were removed under direct visualization after assuring all of the gas was out. The incisions were closed with 4-0 monocryl and steristrips placed.     The vaginal instruments were removed and silver nitrate applied for hemostasis.     All sponge, needle, and instrument counts were correct.  There were no complications. The patient was awoken and taken to the recovery room in stable condition.    Meghan Martin M.D.

## 2019-07-02 LAB — COPATH REPORT: NORMAL

## 2019-07-14 ENCOUNTER — HOSPITAL ENCOUNTER (EMERGENCY)
Facility: CLINIC | Age: 49
Discharge: HOME | End: 2019-07-14
Attending: EMERGENCY MEDICINE | Admitting: EMERGENCY MEDICINE
Payer: COMMERCIAL

## 2019-07-14 VITALS
HEIGHT: 63 IN | DIASTOLIC BLOOD PRESSURE: 73 MMHG | BODY MASS INDEX: 27.64 KG/M2 | OXYGEN SATURATION: 100 % | RESPIRATION RATE: 20 BRPM | SYSTOLIC BLOOD PRESSURE: 163 MMHG | WEIGHT: 156 LBS | TEMPERATURE: 98.7 F | HEART RATE: 67 BPM

## 2019-07-14 DIAGNOSIS — K08.89 PAIN, DENTAL: ICD-10-CM

## 2019-07-14 PROCEDURE — 99283 EMERGENCY DEPT VISIT LOW MDM: CPT | Mod: 25

## 2019-07-14 PROCEDURE — 25000132 ZZH RX MED GY IP 250 OP 250 PS 637: Performed by: EMERGENCY MEDICINE

## 2019-07-14 PROCEDURE — 64400 NJX AA&/STRD TRIGEMINAL NRV: CPT

## 2019-07-14 RX ORDER — BUPIVACAINE HYDROCHLORIDE AND EPINEPHRINE 5; 5 MG/ML; UG/ML
3.6 INJECTION, SOLUTION PERINEURAL ONCE
Status: DISCONTINUED | OUTPATIENT
Start: 2019-07-14 | End: 2019-07-14 | Stop reason: HOSPADM

## 2019-07-14 RX ORDER — HYDROCODONE BITARTRATE AND ACETAMINOPHEN 5; 325 MG/1; MG/1
2 TABLET ORAL ONCE
Status: COMPLETED | OUTPATIENT
Start: 2019-07-14 | End: 2019-07-14

## 2019-07-14 RX ORDER — HYDROCODONE BITARTRATE AND ACETAMINOPHEN 5; 325 MG/1; MG/1
1-2 TABLET ORAL EVERY 6 HOURS PRN
Qty: 15 TABLET | Refills: 0 | Status: SHIPPED | OUTPATIENT
Start: 2019-07-14 | End: 2019-07-17

## 2019-07-14 RX ADMIN — HYDROCODONE BITARTRATE AND ACETAMINOPHEN 2 TABLET: 5; 325 TABLET ORAL at 20:10

## 2019-07-14 ASSESSMENT — ENCOUNTER SYMPTOMS
DIARRHEA: 0
FEVER: 0

## 2019-07-14 ASSESSMENT — MIFFLIN-ST. JEOR: SCORE: 1291.74

## 2019-07-14 NOTE — ED AVS SNAPSHOT
Emergency Department  [address] [place] [address]  Phone:  [phone]  Fax:  [phone]                                    Olga Colvin   MRN: [id number]    Department:   Emergency Department   Date of Visit:  7/14/2019           After Visit Summary Signature Page    I have received my discharge instructions, and my questions have been answered. I have discussed any challenges I see with this plan with the nurse or doctor.    ..........................................................................................................................................  Patient/Patient Representative Signature      ..........................................................................................................................................  Patient Representative Print Name and Relationship to Patient    ..................................................               ................................................  Date                                   Time    ..........................................................................................................................................  Reviewed by Signature/Title    ...................................................              ..............................................  Date                                               Time          22EPIC Rev 08/18

## 2019-07-15 NOTE — ED PROVIDER NOTES
History     Chief Complaint:  Dental Problem      HPI   Olga Colvin is a 49 year old female who presents to the emergency department today for evaluation of dental problem. The patient reports she had a root canal on June 27th on tooth 18. She developed severe dental pain after this procedure. One week prior she presented back to her dentist who diagnosed her with an infection and started her on Amoxicillin for 5 days, and filled the area with cotton and medicine and closed up the wound. Two days prior she felt as if this cotton came out and since the infection continued to get worse. Yesterday her dentist started her on Clindamycin for her worsening symptoms which she takes 4x per day. Due to these symptoms she presented to the emergency department today. She reports she hasn't been taking NSAID's due to her gastric bypass surgery, but she finally took a 200 mg Advil that improved her symptoms. She also tried Orajel which helped as well.  Additionally, one day after her root canal she had a hysteroscopy with D&C and says this procedure doesn't come close to the pain from her root canal. She also had jaw surgery when she was younger, so it's normal for her to have some numbness in her jaw. She denies fever, diarrhea.       Allergies:  Aspirin  Codeine Camsylate  No Clinical Screening - See Comments  Nsaids        Medications:    calcium carbonate (OS-LLOYD) 500 MG tablet  cholecalciferol (VITAMIN D3) 1000 units (25 mcg) capsule  cyanocobalamin (VITAMIN B-12) 1000 MCG tablet  DULoxetine (CYMBALTA) 60 MG capsule  ferrous fumarate 65 mg, [place]. FE,-Vitamin C 125 mg (VITRON C)  MG TABS tablet  folic acid (FOLVITE) 400 MCG tablet  oxyCODONE (ROXICODONE) 5 MG tablet  TRAZODONE HCL PO  ValACYclovir (VALTREX) 1 GM tablet      Past Medical History:    PONV      Past Surgical History:    Appendectomy  C gastric bypass  Cholecystectomy  Dilation and curettage, hysteroscopy diagnostic combined x2  ENT  "surgery  LEEP  HC reduction large breast  Laparoscopic salpingo-oophorectomy       Family History:    History reviewed. No pertinent family history.        Social History:  The patient was accompanied to the ED by significant other.  Smoking Status: Never Smoker  Smokeless Tobacco: Never Used  Alcohol Use: Yes   Marital Status:  Single [1]       Review of Systems   Constitutional: Negative for fever.   HENT: Positive for dental problem.    Gastrointestinal: Negative for diarrhea.   All other systems reviewed and are negative.        Physical Exam   First Vitals:  BP: 163/73  Pulse: 67  Temp: 98.7  F (37.1  C)  Resp: 20  Height: 160 cm (5' 3\")  Weight: 70.8 kg (156 lb)  SpO2: 100 %      Physical Exam  General: Resting uncomfortably on the gurney  Head:  The scalp, face, and head appear normal  Eyes:  The pupils are normal    Conjunctivae and sclera appear normal  ENT:    The nose is normal    Ears/pinnae are normal  Neck:  Normal range of motion  Mouth: Tooth #18 has an endodontic, operative drilling site centrally located. This doesn't have a filling currently. The gingiva both lingual and buccal and without abscess. Placing topical anesthetic over the tooth gives immediate relief of pain. Likely indicative of ongoing nerve root or alveolar socket inflammation. There is no dental abscess or facial abscess noted. The sublingual area is normal. There is no Fer's angina.  Skin:  No rash or lesions noted.  Neuro: Speech is normal and fluent  Psych:  Awake. Alert.  Normal affect.      Appropriate interactions    Emergency Department Course     Procedures:  Nerve Block procedure:   Topical 4% lidocaine was used to anesthetize the mucosa and tooth. The inferior alveolar nerve block was performed with bupivacaine with epinephrine with good relief.     Interventions:  2010 Norco 5-325 mg per 2 tablets       Emergency Department Course:  Nursing notes and vitals reviewed.  1935: I performed an exam of the patient as " documented above.     2000 I performed the above procedure.     Findings and plan explained to the Patient. Patient discharged home with instructions regarding supportive care, medications, and reasons to return. The importance of close follow-up was reviewed.     Impression & Plan      Medical Decision Making:  This patient presents with ongoing dental pain and tooth #18.  There is a endodontic drilling hole in the center of the tooth.  There is no longer cotton or a temporary filling over this.  The canals which are presumably shaped are now exposed to air and saliva.  The patient does get improvement with topical atomized local anesthetic into the canals.  This diagnostically may represent nerve irritation in the canals or deep in the alveolar socket.  She responded nicely to an inferior alveolar nerve block.  There is no evidence of dental abscess or gingival abscess that can be drained.  Endodontic consultation is likely warranted given the difficulties that she is having.  She is on clindamycin and that can be continued.  She is on a probiotic.  She is concerned about taking ibuprofen given her remote gastric bypass but a few doses are likely to be helpful and not harmful.  She did not get relief from oxycodone so she will be given a short supply of Norco as an alternative agent.  No facial abscess or cellulitis or sublingual abscess is noted.        Diagnosis:    ICD-10-CM    1. Pain, dental K08.89        Disposition:  Discharged to home with prescription.     Continue the clindamycin.  The Augmentin prescription can be held for future use as needed and need not be taken simultaneously.    Discharge Medications:     Medication List      Started    amoxicillin-clavulanate 875-125 MG tablet  Commonly known as:  AUGMENTIN  1 tablet, Oral, 2 TIMES DAILY     HYDROcodone-acetaminophen 5-325 MG tablet  Commonly known as:  NORCO  1-2 tablets, Oral, EVERY 6 HOURS PRN            Scribe Disclosure:  Radhika KLINE, am  serving as a scribe at 7:37 PM on 7/14/2019 to document services personally performed by Marc Casanova MD based on my observations and the provider's statements to me.    Radhika Blackburn  7/14/2019    EMERGENCY DEPARTMENT       Marc Casanova MD  07/14/19 2051

## 2019-07-15 NOTE — ED TRIAGE NOTES
Patient here with dental pain stated she is being treated for an abscess on antibiotic but the cannot control her pain

## 2019-07-15 NOTE — DISCHARGE INSTRUCTIONS
You may take a dose or 2 of ibuprofen to help with pain.  You will likely need a dose or 2 of Norco overnight when the dental block wears off.  See your dentist or an endodontist specialist tomorrow or the next day for more detailed evaluation of the root canal.  You may have some ongoing nerve tissue in the canal, your periodontal ligament and alveolar socket may be irritated or inflamed or there could be a crack in 1 of the canals.  Expert consultation is likely indicated given your progression of symptoms.    Continue the clindamycin at this time.  I have provided a prescription for Augmentin which you can hold onto at this time and you do not need to start it.  You may require this antibiotic down the road.

## 2019-11-06 ENCOUNTER — TELEPHONE (OUTPATIENT)
Dept: OTHER | Facility: CLINIC | Age: 49
End: 2019-11-06

## 2019-11-06 NOTE — TELEPHONE ENCOUNTER
Patient was with her mother today at her mothers OV with Dr. Watt.    Per Dr. Watt Olga would like a OV at the same time as her mothers f/u with Dr. Watt to discuss varicose veins. (note on mothers gold sheet with MRN #)    Routing to scheduling to contact.    Karin BUSHN, RN    [facility]  [facility]  Office: [phone]  Fax: [phone]

## 2020-02-11 ENCOUNTER — HOSPITAL ENCOUNTER (EMERGENCY)
Facility: CLINIC | Age: 50
Discharge: HOME | End: 2020-02-11
Attending: PHYSICIAN ASSISTANT | Admitting: PHYSICIAN ASSISTANT
Payer: COMMERCIAL

## 2020-02-11 VITALS
SYSTOLIC BLOOD PRESSURE: 150 MMHG | HEART RATE: 80 BPM | RESPIRATION RATE: 16 BRPM | BODY MASS INDEX: 25.61 KG/M2 | OXYGEN SATURATION: 99 % | WEIGHT: 150 LBS | HEIGHT: 64 IN | TEMPERATURE: 98.4 F | DIASTOLIC BLOOD PRESSURE: 77 MMHG

## 2020-02-11 DIAGNOSIS — S09.90XA INJURY OF HEAD, INITIAL ENCOUNTER: ICD-10-CM

## 2020-02-11 DIAGNOSIS — S09.92XA NASAL INJURY, INITIAL ENCOUNTER: ICD-10-CM

## 2020-02-11 PROCEDURE — 99282 EMERGENCY DEPT VISIT SF MDM: CPT

## 2020-02-11 ASSESSMENT — ENCOUNTER SYMPTOMS
PHOTOPHOBIA: 1
VOMITING: 0
NECK PAIN: 1
DIZZINESS: 1
NUMBNESS: 0
HEADACHES: 1
NAUSEA: 1

## 2020-02-11 ASSESSMENT — MIFFLIN-ST. JEOR: SCORE: 1275.4

## 2020-02-11 NOTE — ED PROVIDER NOTES
History   Chief Complaint:  Fall     The history is provided by the patient.      Olga Colvin is a 50 year old female with a history of hypertension who presents to the emergency department today for evaluation after a fall. The patient reports that three days ago (02/08) in the evening she tripped and struck her forehead and the bridge of her nose on a glass block wall, she did not lose consciousness. Her nose initially bled for ten minutes before is subsided. Overnight she stayed up with her  and iced her nose. Since the fall she states she has developed a headache, dizziness, photophobia, and nausea. She adds that her nose feels and looks displaced to her, and it began to bruise yesterday. She states that her neck has been painful which is exacerbated when looking to her sides. 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 The patient has been taking Tylenol at home since the injury. She denies having any vomiting, numbness, tingling, or malocclusion. She states she did not sustain any other injuries and has been ambulating well since the fall. The patient adds that she injured her nose in the past and had to have it reset.     Allergies:  Aspirin  Codeine Camsylate  Nsaids    Medications:   Cymbalta  Ferrous sulfate  Trazodone   Valtrex     Past Medical History:    Polycystic ovary syndrome  Depression  Hyperlipidemia   Achlorhydria   Eating disorder   Hypertension   Obstructive sleep apnea   Uterine polyp   Genital herpes    Past Surgical History:    Appendectomy  Gastric bypass  Cholecystectomy   Dilation and curettage, x 2  Jaw surgery, upper and lower  LEEP procedure  Breast reduction, bilateral  Bilateral salipingo oopherectomy, laparoscopic      Family History:    Alcohol abuse  Cancer  Diabetes, type I  Bradycardia  Depression      Social  "History:  The patient was accompanied to the ED by her , Jason.  Smoking Status: Never Smoker  Smokeless Tobacco: Never Used  Alcohol Use: Positive, 2/week  Drug Use: Negative    PCP: Rene Smith     Review of Systems   HENT: Positive for nosebleeds (resolved).         No malocclusion    Eyes: Positive for photophobia.   Gastrointestinal: Positive for nausea. Negative for vomiting.   Musculoskeletal: Positive for neck pain. Negative for gait problem.   Neurological: Positive for dizziness and headaches. Negative for syncope and numbness.        No tingling    All other systems reviewed and are negative.    Physical Exam     Patient Vitals for the past 24 hrs:   BP Temp Temp src Pulse Resp SpO2 Height Weight   02/11/20 1319 (!) 150/77 98.4  F (36.9  C) Oral 80 16 99 % 1.626 m (5' 4\") 68 kg (150 lb)     Physical Exam  General: Resting comfortably.  Alert and oriented.   Head:  The scalp, face, and head appear normal.    No skull fracture or step off palpated.   Eyes:  The pupils are equal, round, and reactive to light     Extraocular muscles are intact    Conjunctivae and sclerae are normal    ENT:    The oropharynx is normal    Uvula is in the midline     No hemotympanum.    No malocclusion.    There is mild ecchymosis noted to the bridge of the nose. There is no obvious abnormality although there is potentially slight deviation to the left. No palpable obvious fracture. No overlying lacerations or abrasions. There is mild swelling to this area. No septal hematoma.  Neck:  Normal range of motion    There is no midline cervical spine pain/tenderness   CV:  Regular rate and rhythm     Normal S1/S2  Resp:  Lungs are clear to auscultation    Non-labored    No rales or wheezing   MS:  No midline cervical spinal tenderness.  Range of motion of all joints in bilateral upper and lower extremities is within normal limits. Patient is ambulatory.   Skin:  See ENT otherwise WNL.  Neuro:  Speech is normal and fluent. " Cranial nerves 2-12 grossly intact.  strength is equal. Strength and sensation intact in all 4 extremities. Finger-nose finger and heel shin intact. No focal deficits. GCS 15.        Emergency Department Course     Emergency Department Course:  1331 Nursing notes and vitals reviewed.  1340 I performed an exam of the patient as documented above. Discussed diagnosis and appropriate follow up.    Findings and plan explained to the Patient and spouse. Patient discharged home with instructions regarding supportive care, medications, and reasons to return. The importance of close follow-up was reviewed.     I personally reviewed the treatment plan with the Patient and spouse and answered all related questions prior to discharge.      Impression & Plan      Medical Decision Making:  Olga Colvin is a well appearing 50 year old female who presents for evaluation of closed head injury.  Please refer to the HPI for full details.  Patient is neurologically normal.  She did not lose consciousness.  There is been no vomiting, or abnormal behavior.  The patient is not on blood thinners. By [place] head CT criteria, the patient falls into a very low risk category for skull fracture or intracranial injury.  No indication for CT imaging at this time.  C-spine is cleared clinically per [place] C-spine rules.  No indication for imaging of this area either.  The patient's main concern is pain to her nose. There is no obvious step off or deformity. There is mild swelling and bruising to this area. There is no overlying laceration or abrasion. I discussed imaging with the patient and stated that it would likely not change how I managed her today. With shared decision making, the patient then deferred any imaging. I believe this is reasonable as my concern for severe abnormality is low.  The patient has no  significant additional facial tenderness. There is no septal hematoma on exam.  I did offer CT imaging despite my  "discussion with her.  She declined. I did however, supply her with ENT follow up information for reassessment after swelling improves. Tylenol for pain. I also discussed continued icing. She understand that they must return if any \"red flag\" symptoms develop after discharge--including severe headache, vomiting, abnormal behavior, seizures, or any other concerns--as this could indicate intracranial injury and require a CT scan.  I have discussed second impact syndrome, the importance of not sustaining repeated concussion while still symptomatic, and appropriate precautions.  Overall, I believe the patient safe to discharge home.  She does understand that there could be a subtle nasal fracture that was not clinically appreciated.  She will follow-up with ENT in the next 3 to 5 days for recheck.  She will return immediately for any of the above symptoms, or any other concerns.  All questions were answered prior to discharge.  The patient understands and agrees to this plan.    Diagnosis:    ICD-10-CM    1. Injury of head, initial encounter S09.90XA    2. Nasal injury, initial encounter S09.92XA        Disposition:   The patient is discharged to home.     Scribe Disclosure:  I, Kourtney Bolanos, am serving as a scribe at 1:40 PM on 2/11/2020 to document services personally performed by Luana Booker PA-C based on my observations and the provider's statements to me.    [facility] EMERGENCY DEPARTMENT        Luana Booker PA-C  02/11/20 1621    "

## 2020-02-11 NOTE — ED AVS SNAPSHOT
Emergency Department  [address] [facility] [address]  Phone:  [phone]  Fax:  [phone]                                    Olga Colvin   MRN: [id number]    Department:   Emergency Department   Date of Visit:  2/11/2020           After Visit Summary Signature Page    I have received my discharge instructions, and my questions have been answered. I have discussed any challenges I see with this plan with the nurse or doctor.    ..........................................................................................................................................  Patient/Patient Representative Signature      ..........................................................................................................................................  Patient Representative Print Name and Relationship to Patient    ..................................................               ................................................  Date                                   Time    ..........................................................................................................................................  Reviewed by Signature/Title    ...................................................              ..............................................  Date                                               Time          22EPIC Rev 08/18

## 2021-10-28 ENCOUNTER — LAB REQUISITION (OUTPATIENT)
Dept: LAB | Facility: CLINIC | Age: 51
End: 2021-10-28
Payer: COMMERCIAL

## 2021-10-28 LAB
LAB DIRECTOR COMMENTS: ABNORMAL
LAB DIRECTOR DISCLAIMER: ABNORMAL
LAB DIRECTOR INTERPRETATION: ABNORMAL
LAB DIRECTOR METHODOLOGY: ABNORMAL
LAB DIRECTOR RESULTS: ABNORMAL
SPECIMEN DESCRIPTION: ABNORMAL

## 2021-10-28 PROCEDURE — 88112 CYTOPATH CELL ENHANCE TECH: CPT | Mod: TC,ORL | Performed by: COLON & RECTAL SURGERY

## 2021-10-28 PROCEDURE — G0452 MOLECULAR PATHOLOGY INTERPR: HCPCS | Mod: 26 | Performed by: PATHOLOGY

## 2021-10-28 PROCEDURE — 87624 HPV HI-RISK TYP POOLED RSLT: CPT | Mod: ORL | Performed by: COLON & RECTAL SURGERY

## 2021-11-01 ENCOUNTER — LAB REQUISITION (OUTPATIENT)
Dept: LAB | Facility: CLINIC | Age: 51
End: 2021-11-01
Payer: COMMERCIAL

## 2021-11-01 LAB
PATH REPORT.COMMENTS IMP SPEC: NORMAL
PATH REPORT.FINAL DX SPEC: NORMAL
PATH REPORT.GROSS SPEC: NORMAL
PATH REPORT.RELEVANT HX SPEC: NORMAL

## 2021-11-01 PROCEDURE — 88112 CYTOPATH CELL ENHANCE TECH: CPT | Mod: 26 | Performed by: PATHOLOGY

## 2022-10-13 ENCOUNTER — HOSPITAL ENCOUNTER (EMERGENCY)
Facility: CLINIC | Age: 52
Discharge: HOME | End: 2022-10-13
Attending: EMERGENCY MEDICINE | Admitting: EMERGENCY MEDICINE
Payer: COMMERCIAL

## 2022-10-13 VITALS
SYSTOLIC BLOOD PRESSURE: 129 MMHG | WEIGHT: 176 LBS | BODY MASS INDEX: 28.28 KG/M2 | HEIGHT: 66 IN | OXYGEN SATURATION: 99 % | TEMPERATURE: 98.1 F | DIASTOLIC BLOOD PRESSURE: 77 MMHG | HEART RATE: 78 BPM

## 2022-10-13 DIAGNOSIS — L03.311 CELLULITIS OF ABDOMINAL WALL: ICD-10-CM

## 2022-10-13 LAB
ANION GAP SERPL CALCULATED.3IONS-SCNC: 5 MMOL/L (ref 3–14)
BASOPHILS # BLD AUTO: 0 10E3/UL (ref 0–0.2)
BASOPHILS NFR BLD AUTO: 0 %
BUN SERPL-MCNC: 19 MG/DL (ref 7–30)
CALCIUM SERPL-MCNC: 9.5 MG/DL (ref 8.5–10.1)
CHLORIDE BLD-SCNC: 103 MMOL/L (ref 94–109)
CO2 SERPL-SCNC: 30 MMOL/L (ref 20–32)
CREAT SERPL-MCNC: 1.13 MG/DL (ref 0.52–1.04)
EOSINOPHIL # BLD AUTO: 0.1 10E3/UL (ref 0–0.7)
EOSINOPHIL NFR BLD AUTO: 1 %
ERYTHROCYTE [DISTWIDTH] IN BLOOD BY AUTOMATED COUNT: 12.4 % (ref 10–15)
GFR SERPL CREATININE-BSD FRML MDRD: 58 ML/MIN/1.73M2
GLUCOSE BLD-MCNC: 87 MG/DL (ref 70–99)
HCT VFR BLD AUTO: 37.6 % (ref 35–47)
HGB BLD-MCNC: 12.5 G/DL (ref 11.7–15.7)
IMM GRANULOCYTES # BLD: 0 10E3/UL
IMM GRANULOCYTES NFR BLD: 0 %
LYMPHOCYTES # BLD AUTO: 1.8 10E3/UL (ref 0.8–5.3)
LYMPHOCYTES NFR BLD AUTO: 20 %
MCH RBC QN AUTO: 32 PG (ref 26.5–33)
MCHC RBC AUTO-ENTMCNC: 33.2 G/DL (ref 31.5–36.5)
MCV RBC AUTO: 96 FL (ref 78–100)
MONOCYTES # BLD AUTO: 0.8 10E3/UL (ref 0–1.3)
MONOCYTES NFR BLD AUTO: 8 %
NEUTROPHILS # BLD AUTO: 6.6 10E3/UL (ref 1.6–8.3)
NEUTROPHILS NFR BLD AUTO: 71 %
NRBC # BLD AUTO: 0 10E3/UL
NRBC BLD AUTO-RTO: 0 /100
PLATELET # BLD AUTO: 229 10E3/UL (ref 150–450)
POTASSIUM BLD-SCNC: 4.4 MMOL/L (ref 3.4–5.3)
RBC # BLD AUTO: 3.91 10E6/UL (ref 3.8–5.2)
SODIUM SERPL-SCNC: 138 MMOL/L (ref 133–144)
WBC # BLD AUTO: 9.3 10E3/UL (ref 4–11)

## 2022-10-13 PROCEDURE — 36415 COLL VENOUS BLD VENIPUNCTURE: CPT | Performed by: EMERGENCY MEDICINE

## 2022-10-13 PROCEDURE — 85004 AUTOMATED DIFF WBC COUNT: CPT | Performed by: EMERGENCY MEDICINE

## 2022-10-13 PROCEDURE — 80048 BASIC METABOLIC PNL TOTAL CA: CPT | Performed by: EMERGENCY MEDICINE

## 2022-10-13 PROCEDURE — 99283 EMERGENCY DEPT VISIT LOW MDM: CPT

## 2022-10-13 RX ORDER — CLINDAMYCIN HCL 150 MG
450 CAPSULE ORAL 3 TIMES DAILY
Qty: 90 CAPSULE | Refills: 0 | Status: SHIPPED | OUTPATIENT
Start: 2022-10-13 | End: 2022-10-23

## 2022-10-13 ASSESSMENT — ENCOUNTER SYMPTOMS
FEVER: 0
COLOR CHANGE: 1
VOMITING: 0

## 2022-10-13 ASSESSMENT — ACTIVITIES OF DAILY LIVING (ADL): ADLS_ACUITY_SCORE: 33

## 2022-10-13 NOTE — ED NOTES
Rapid Assessment Note    History:   Olga Colvin is a 52 year old female who presents with pain, swelling, and redness above the left pubic area. She reports that the area is warm to the touch. The patient reports that she plucked a hair in this area recently which may have caused the wound. She denies fevers and vomiting. The patient notes that she called the nurse at her ob/gyn office and was told to come to the emergency department.     Exam:   General:  Alert, interactive  Cardiovascular:  Well perfused  Lungs:  No respiratory distress, no accessory muscle use  Neuro:  Moving all 4 extremities  Skin: Erythema and warmth from midline to lateral lower abdomen  Consistent with cellulitis, central aspect of the erythema is firm and cordlike.  No significant fluctuance.  No visible fluid.  Psych:  Normal affect    Plan of Care:   I evaluated the patient and developed an initial plan of care. I discussed this plan and explained that I, or one of my partners, would be returning to complete the evaluation.     On examination, appears to have cellulitis.  Concern for a deep abscess given the firm area in the middle. No apparent extension to the perineum.  Needs an ultrasound to evaluate for abscess or fluid collection and possible drainage if this is found to be present.  Basic labs ordered.  Patient requested to remain NPO.  No sepsis symptoms, afebrile and with otherwise normal vital signs.    I, Evette Lubin, am serving as a scribe to document services personally performed by Tati Laguerre MD, based on my observations and the provider's statements to me.    10/13/2022  [employer]    Portions of this medical record were completed by a scribe. UPON MY REVIEW AND AUTHENTICATION BY ELECTRONIC SIGNATURE, this confirms (a) I performed the applicable clinical services, and (b) the record is accurate.        Tati Laguerre MD  10/13/22 1831       Tati Laguerre MD  10/13/22 1832

## 2022-10-13 NOTE — ED TRIAGE NOTES
Patient has an infection under her skin above her pubic area that she says could be related to an injury.     Triage Assessment     Row Name 10/13/22 5549       Triage Assessment (Adult)    Airway WDL WDL       Respiratory WDL    Respiratory WDL WDL       Skin Circulation/Temperature WDL    Skin Circulation/Temperature WDL WDL       Cardiac WDL    Cardiac WDL WDL       Peripheral/Neurovascular WDL    Peripheral Neurovascular WDL WDL       Cognitive/Neuro/Behavioral WDL    Cognitive/Neuro/Behavioral WDL WDL

## 2022-10-14 NOTE — ED PROVIDER NOTES
History   Chief Complaint:  Wound Check       HPI   Olga Colvin is a 52 year old female with history of type II diabetes mellitus, hypertension, hyperlipidemia who presents with suprapubic pain, swelling, redness, and hardening after she plucked a hair in the area. The area is red and warm to the touch. She denies fever or vomiting. She has been applying heat to the area. The hardened area has been present for 4 days, but the redness has appeared over the last day.  She tried using a needle today got it and has been squeezing it regularly over the last for 5 days try to get drainage out.  She has trichotillomania and does a lot of picking and plucking of hairs.  Picture right under    Review of Systems   Constitutional: Negative for fever.   Gastrointestinal: Negative for vomiting.   Genitourinary: Positive for pelvic pain.   Skin: Positive for color change.   All other systems reviewed and are negative.      Allergies:  Aspirin  Codeine Camsylate  Nsaids    Medications:  Calcium carbonate   Cholecalciferol   Cyanocobalamin   Duloxetine   Ferrous fumarate  Folic acid   Oxycodone   Trazodone   Valacyclovir     Past Medical History:     Left ovarian cyst   Achlorhydria   PCOS  Depression  Hyperlipidemia   Vitamin B12 deficiency   Type II diabetes mellitus   Morbid obesity   FRAN  Adenomatous polyp of colon  Osteoarthritis of multiple joints   Genital herpes   Uterine polyp   AGATA  Fen-phen history  Acute appendicitis   Eating disorder    Past Surgical History:    Appendectomy   Cholecystectomy   Dilation and curettage   ENT surgery  Reduction of large breast   Salpingo-oophorectomy   Gastric bypass   Oophorectomy     Family History:    Father: alcohol abuse, cancer, type I diabetes mellitus, early death  Mother: alcohol abuse, bradycardia, depression, early death, heart disease, hypertension     Social History:  The patient presents to the ED by herself.   PCP: Rene Smith     Physical Exam     Patient Vitals  "for the past 24 hrs:   BP Temp Temp src Pulse SpO2 Height Weight   10/13/22 2308 129/77 98.1  F (36.7  C) Oral 78 99 % -- --   10/13/22 1818 133/53 98  F (36.7  C) Temporal 87 100 % 1.676 m (5' 6\") 79.8 kg (176 lb)       Physical Exam  Nursing note and vitals reviewed.    Constitutional:  Appears comfortable.    HENT:    Nose normal.  No discharge.      Oral mucosa is moist.    GI:    Her abdomen overall is nontender.  She has tenderness only in the red area in the indurated area.  Please see the picture.  Neurological:   Alert and oriented. No focal weakness.  Skin:    Area of erythema that extends from the mid pubic area over toward the left superior iliac crest and down toward the groin.  There is an area of about 3 inches x 1 inch wide of induration right through the middle.  There is no fluctuance.  It is tender and warm to touch.  There is no drainage.  Psychiatric:   Behavior is normal. Appropriate mood and affect.     Judgment and thought content normal.               Emergency Department Course       Laboratory:  Labs Ordered and Resulted from Time of ED Arrival to Time of ED Departure   BASIC METABOLIC PANEL - Abnormal       Result Value    Sodium 138      Potassium 4.4      Chloride 103      Carbon Dioxide (CO2) 30      Anion Gap 5      Urea Nitrogen 19      Creatinine 1.13 (*)     Calcium 9.5      Glucose 87      GFR Estimate 58 (*)    CBC WITH PLATELETS AND DIFFERENTIAL    WBC Count 9.3      RBC Count 3.91      Hemoglobin 12.5      Hematocrit 37.6      MCV 96      MCH 32.0      MCHC 33.2      RDW 12.4      Platelet Count 229      % Neutrophils 71      % Lymphocytes 20      % Monocytes 8      % Eosinophils 1      % Basophils 0      % Immature Granulocytes 0      NRBCs per 100 WBC 0      Absolute Neutrophils 6.6      Absolute Lymphocytes 1.8      Absolute Monocytes 0.8      Absolute Eosinophils 0.1      Absolute Basophils 0.0      Absolute Immature Granulocytes 0.0      Absolute NRBCs 0.0      "     Emergency Department Course:  Reviewed:  I reviewed nursing notes, vitals, past medical history and Care Everywhere    Assessments:  2250 I obtained history and examined the patient as noted above.     Disposition:  The patient was discharged to home.     Impression & Plan       Medical Decision Making:  Patient comes in with cellulitis over the left suprapubic and lower abdominal wall area.  She had been digging at this area with a needle and has a large indurated area.  I threw the ultrasound probe on it and there is no abscess.  It is just indurated tissue.  I outlined it.  Her basic panel and CBC are normal.  There was nothing to drain.  I am going to put this patient on clindamycin with close follow-up in the next 3 to 4 days with recommendation to return to the emergency room if she gets worse with increasing redness fevers and pain.  Patient is comfortable with this plan.  She will continue hot packs.      Clindamycin 450 mg 3 times a day with food, warm packs with Epson salts, Tylenol as needed.  Recheck in the clinic next week.  If you get worse over the weekend with marked increase in the redness, fevers, worsening pain, return to the emergency room.  You need to see your doctor for long-term management of this firm area.  Do not pick or squeeze.  No more needles.  She tried poking it with a needle    Diagnosis:    ICD-10-CM    1. Cellulitis of abdominal wall  L03.311           Discharge Medications:  Discharge Medication List as of 10/13/2022 11:07 PM      START taking these medications    Details   clindamycin (CLEOCIN) 150 MG capsule Take 3 capsules (450 mg) by mouth 3 times daily for 10 days, Disp-90 capsule, R-0, E-Prescribe             Scribe Disclosure:  I, Joshua Kjer, am serving as a scribe at 10:51 PM on 10/13/2022 to document services personally performed by Sonya Michel MD based on my observations and the provider's statements to me.            Sonya Michel MD  10/13/22  [id number]

## 2022-10-14 NOTE — DISCHARGE INSTRUCTIONS
Clindamycin 450 mg 3 times a day with food, warm packs with Epson salts, Tylenol as needed.  Recheck in the clinic next week.  If you get worse over the weekend with marked increase in the redness, fevers, worsening pain, return to the emergency room.  You need to see your doctor for long-term management of this firm area.  Do not pick or squeeze.  No more needles.  She tried poking it with a needle

## 2023-04-27 PROCEDURE — 88112 CYTOPATH CELL ENHANCE TECH: CPT | Mod: 26

## 2023-04-27 PROCEDURE — 88112 CYTOPATH CELL ENHANCE TECH: CPT | Mod: TC,ORL | Performed by: COLON & RECTAL SURGERY

## 2023-04-28 ENCOUNTER — LAB REQUISITION (OUTPATIENT)
Dept: LAB | Facility: CLINIC | Age: 53
End: 2023-04-28
Payer: COMMERCIAL

## 2023-05-03 LAB
PATH REPORT.COMMENTS IMP SPEC: NORMAL
PATH REPORT.FINAL DX SPEC: NORMAL
PATH REPORT.GROSS SPEC: NORMAL
PATH REPORT.MICROSCOPIC SPEC OTHER STN: NORMAL
PATH REPORT.RELEVANT HX SPEC: NORMAL

## 2023-10-24 ENCOUNTER — TRANSCRIBE ORDERS (OUTPATIENT)
Dept: GASTROENTEROLOGY | Facility: CLINIC | Age: 53
End: 2023-10-24
Payer: COMMERCIAL

## 2023-10-24 ENCOUNTER — TELEPHONE (OUTPATIENT)
Dept: GASTROENTEROLOGY | Facility: CLINIC | Age: 53
End: 2023-10-24
Payer: COMMERCIAL

## 2023-10-24 DIAGNOSIS — Z86.0100 HISTORY OF COLONIC POLYPS: Primary | ICD-10-CM

## 2023-10-24 DIAGNOSIS — Z12.11 SCREEN FOR COLON CANCER: ICD-10-CM

## 2023-10-24 NOTE — TELEPHONE ENCOUNTER
REMINDER: We do not accept [employer] insurance.      Procedure Scheduled: Lower Endoscopy [Colonoscopy]  Procedure Date: 12/12/2023 @ 11:15am  Site:[facility]; [address], [place] [address]  Endoscopist: RANDELL  Ordering Provider: RANDELL  Scheduled by (per the direction of): Fax from [facility]

## 2023-12-12 ENCOUNTER — HOSPITAL ENCOUNTER (OUTPATIENT)
Facility: CLINIC | Age: 53
Discharge: HOME | End: 2023-12-12
Attending: COLON & RECTAL SURGERY | Admitting: COLON & RECTAL SURGERY
Payer: COMMERCIAL

## 2023-12-12 VITALS
RESPIRATION RATE: 14 BRPM | BODY MASS INDEX: 30.3 KG/M2 | SYSTOLIC BLOOD PRESSURE: 124 MMHG | OXYGEN SATURATION: 99 % | WEIGHT: 171 LBS | HEIGHT: 63 IN | HEART RATE: 65 BPM | DIASTOLIC BLOOD PRESSURE: 77 MMHG

## 2023-12-12 LAB — COLONOSCOPY: NORMAL

## 2023-12-12 PROCEDURE — 99153 MOD SED SAME PHYS/QHP EA: CPT | Mod: PT | Performed by: COLON & RECTAL SURGERY

## 2023-12-12 PROCEDURE — 88305 TISSUE EXAM BY PATHOLOGIST: CPT | Mod: TC | Performed by: COLON & RECTAL SURGERY

## 2023-12-12 PROCEDURE — 45382 COLONOSCOPY W/CONTROL BLEED: CPT | Performed by: COLON & RECTAL SURGERY

## 2023-12-12 PROCEDURE — 88305 TISSUE EXAM BY PATHOLOGIST: CPT | Mod: 26

## 2023-12-12 PROCEDURE — G0500 MOD SEDAT ENDO SERVICE >5YRS: HCPCS | Mod: PT | Performed by: COLON & RECTAL SURGERY

## 2023-12-12 PROCEDURE — 250N000011 HC RX IP 250 OP 636: Performed by: COLON & RECTAL SURGERY

## 2023-12-12 PROCEDURE — 45385 COLONOSCOPY W/LESION REMOVAL: CPT | Performed by: COLON & RECTAL SURGERY

## 2023-12-12 RX ORDER — DIPHENHYDRAMINE HYDROCHLORIDE 50 MG/ML
25-50 INJECTION INTRAMUSCULAR; INTRAVENOUS
Status: DISCONTINUED | OUTPATIENT
Start: 2023-12-12 | End: 2023-12-12 | Stop reason: HOSPADM

## 2023-12-12 RX ORDER — FENTANYL CITRATE 50 UG/ML
50-100 INJECTION, SOLUTION INTRAMUSCULAR; INTRAVENOUS EVERY 5 MIN PRN
Status: DISCONTINUED | OUTPATIENT
Start: 2023-12-12 | End: 2023-12-12 | Stop reason: HOSPADM

## 2023-12-12 RX ORDER — ONDANSETRON 4 MG/1
4 TABLET, ORALLY DISINTEGRATING ORAL EVERY 6 HOURS PRN
Status: DISCONTINUED | OUTPATIENT
Start: 2023-12-12 | End: 2023-12-12 | Stop reason: HOSPADM

## 2023-12-12 RX ORDER — NALOXONE HYDROCHLORIDE 0.4 MG/ML
0.2 INJECTION, SOLUTION INTRAMUSCULAR; INTRAVENOUS; SUBCUTANEOUS
Status: DISCONTINUED | OUTPATIENT
Start: 2023-12-12 | End: 2023-12-12 | Stop reason: HOSPADM

## 2023-12-12 RX ORDER — ONDANSETRON 2 MG/ML
4 INJECTION INTRAMUSCULAR; INTRAVENOUS
Status: COMPLETED | OUTPATIENT
Start: 2023-12-12 | End: 2023-12-12

## 2023-12-12 RX ORDER — FLUMAZENIL 0.1 MG/ML
0.2 INJECTION, SOLUTION INTRAVENOUS
Status: DISCONTINUED | OUTPATIENT
Start: 2023-12-12 | End: 2023-12-12 | Stop reason: HOSPADM

## 2023-12-12 RX ORDER — ONDANSETRON 2 MG/ML
4 INJECTION INTRAMUSCULAR; INTRAVENOUS EVERY 6 HOURS PRN
Status: DISCONTINUED | OUTPATIENT
Start: 2023-12-12 | End: 2023-12-12 | Stop reason: HOSPADM

## 2023-12-12 RX ORDER — NALOXONE HYDROCHLORIDE 0.4 MG/ML
0.4 INJECTION, SOLUTION INTRAMUSCULAR; INTRAVENOUS; SUBCUTANEOUS
Status: DISCONTINUED | OUTPATIENT
Start: 2023-12-12 | End: 2023-12-12 | Stop reason: HOSPADM

## 2023-12-12 RX ORDER — ATROPINE SULFATE 0.1 MG/ML
1 INJECTION INTRAVENOUS
Status: DISCONTINUED | OUTPATIENT
Start: 2023-12-12 | End: 2023-12-12 | Stop reason: HOSPADM

## 2023-12-12 RX ORDER — DULOXETIN HYDROCHLORIDE 60 MG/1
60 CAPSULE, DELAYED RELEASE ORAL DAILY
COMMUNITY

## 2023-12-12 RX ORDER — LIDOCAINE 40 MG/G
CREAM TOPICAL
Status: DISCONTINUED | OUTPATIENT
Start: 2023-12-12 | End: 2023-12-12 | Stop reason: HOSPADM

## 2023-12-12 RX ORDER — PROCHLORPERAZINE MALEATE 10 MG
10 TABLET ORAL EVERY 6 HOURS PRN
Status: DISCONTINUED | OUTPATIENT
Start: 2023-12-12 | End: 2023-12-12 | Stop reason: HOSPADM

## 2023-12-12 RX ORDER — SIMETHICONE 40MG/0.6ML
133 SUSPENSION, DROPS(FINAL DOSAGE FORM)(ML) ORAL
Status: DISCONTINUED | OUTPATIENT
Start: 2023-12-12 | End: 2023-12-12 | Stop reason: HOSPADM

## 2023-12-12 RX ORDER — EPINEPHRINE 1 MG/ML
0.1 INJECTION, SOLUTION INTRAMUSCULAR; SUBCUTANEOUS
Status: DISCONTINUED | OUTPATIENT
Start: 2023-12-12 | End: 2023-12-12 | Stop reason: HOSPADM

## 2023-12-12 RX ADMIN — FENTANYL CITRATE 100 MCG: 50 INJECTION, SOLUTION INTRAMUSCULAR; INTRAVENOUS at 11:37

## 2023-12-12 RX ADMIN — MIDAZOLAM 1 MG: 1 INJECTION INTRAMUSCULAR; INTRAVENOUS at 11:29

## 2023-12-12 RX ADMIN — ONDANSETRON 4 MG: 2 INJECTION INTRAMUSCULAR; INTRAVENOUS at 11:08

## 2023-12-12 RX ADMIN — FENTANYL CITRATE 100 MCG: 50 INJECTION, SOLUTION INTRAMUSCULAR; INTRAVENOUS at 11:24

## 2023-12-12 RX ADMIN — MIDAZOLAM 2 MG: 1 INJECTION INTRAMUSCULAR; INTRAVENOUS at 11:24

## 2023-12-12 RX ADMIN — MIDAZOLAM 1 MG: 1 INJECTION INTRAMUSCULAR; INTRAVENOUS at 11:40

## 2023-12-12 ASSESSMENT — ACTIVITIES OF DAILY LIVING (ADL): ADLS_ACUITY_SCORE: 35

## 2023-12-13 LAB
PATH REPORT.COMMENTS IMP SPEC: NORMAL
PATH REPORT.COMMENTS IMP SPEC: NORMAL
PATH REPORT.FINAL DX SPEC: NORMAL
PATH REPORT.GROSS SPEC: NORMAL
PATH REPORT.MICROSCOPIC SPEC OTHER STN: NORMAL
PATH REPORT.RELEVANT HX SPEC: NORMAL
PHOTO IMAGE: NORMAL

## 2024-01-13 ENCOUNTER — HEALTH MAINTENANCE LETTER (OUTPATIENT)
Age: 54
End: 2024-01-13

## 2024-09-30 NOTE — ANESTHESIA PREPROCEDURE EVALUATION
Anesthesia Pre-Procedure Evaluation    Patient: Olga Colvin   MRN: [id number] : 1970          Preoperative Diagnosis: THICKENED ENDOMETRIAL STRIPE ; LEFT COMPLEX OVARIAN CYST    Procedure(s):  HYSTEROSCOPY, WITH DILATION AND CURETTAGE  USING CHAVEZ AND NEPHEW  MORCELLATOR  LAPAROSCOPIC BILATERAL SALPINGO-OOPHORECTOMY    Past Medical History:   Diagnosis Date     PONV (postoperative nausea and vomiting)      Past Surgical History:   Procedure Laterality Date     APPENDECTOMY       C GASTRIC BYPASS,OBESE<100CM FLAQUITO-EN-Y       CHOLECYSTECTOMY       COLONOSCOPY  2018    Dr. Gogo CAMACHO     DILATION AND CURETTAGE, HYSTEROSCOPY DIAGNOSTIC, COMBINED  10/28/2011    Procedure:COMBINED DILATION AND CURETTAGE, HYSTEROSCOPY DIAGNOSTIC; Hysteroscopy with Dilation and Curettage ; Surgeon:GERARDO AGUAYO; Location:[facility]     ENT SURGERY      upper and lower jaw     GYN SURGERY      [facility] REDUCTION OF LARGE BREAST         Anesthesia Evaluation     . Pt has had prior anesthetic.     History of anesthetic complications   - PONV        ROS/MED HX    ENT/Pulmonary:      (-) sleep apnea   Neurologic:       Cardiovascular:         METS/Exercise Tolerance:     Hematologic:         Musculoskeletal:         GI/Hepatic:        (-) GERD   Renal/Genitourinary: Comment: Ovarian cyst        Endo:         Psychiatric:         Infectious Disease:         Malignancy:         Other:                                 Lab Results   Component Value Date    HGB 12.9 04/15/2005       Preop Vitals  BP Readings from Last 3 Encounters:   19 135/88   18 99/55   10/28/11 122/80    Pulse Readings from Last 3 Encounters:   No data found for Pulse      Resp Readings from Last 3 Encounters:   19 20   18 14   10/28/11 16    SpO2 Readings from Last 3 Encounters:   19 98%   18 100%   10/28/11 95%      Temp Readings from Last 1 Encounters:   19 36.9  C (98.4  F) (Temporal)    Ht Readings from  "Last 1 Encounters:   06/28/19 1.6 m (5' 3\")      Wt Readings from Last 1 Encounters:   06/28/19 73.8 kg (162 lb 12.8 oz)    Estimated body mass index is 28.84 kg/m  as calculated from the following:    Height as of this encounter: 1.6 m (5' 3\").    Weight as of this encounter: 73.8 kg (162 lb 12.8 oz).       Anesthesia Plan      History & Physical Review  History and physical reviewed and following examination; no interval change.    ASA Status:  1 .    NPO Status:  > 8 hours    Plan for General and ETT with Intravenous induction. Maintenance will be Balanced.    PONV prophylaxis:  Ondansetron (or other 5HT-3), Dexamethasone or Solumedrol and Scopolamine patch       Postoperative Care  Postoperative pain management:  IV analgesics and Oral pain medications.      Consents  Anesthetic plan, risks, benefits and alternatives discussed with:  Patient..                 Alva Mooney MD, MD  " No

## 2025-01-25 ENCOUNTER — HEALTH MAINTENANCE LETTER (OUTPATIENT)
Age: 55
End: 2025-01-25

## (undated) DEVICE — ESU GROUND PAD UNIVERSAL W/O CORD

## (undated) DEVICE — ESU PENCIL W/HOLSTER

## (undated) DEVICE — DRSG TELFA 3X8" 1238

## (undated) DEVICE — KIT ENDO TURNOVER/PROCEDURE W/CLEAN A SCOPE LINERS 103888

## (undated) DEVICE — ENDO TROCAR FIRST ENTRY KII FIOS Z-THRD 05X100MM CTF03

## (undated) DEVICE — PANTIES MESH LG/XLG 2PK 706M2

## (undated) DEVICE — ENDO TROCAR FIRST ENTRY KII FIOS Z-THRD 11X100MM CTF33

## (undated) DEVICE — SOL NACL 0.9% INJ 1000ML BAG 2B1324X

## (undated) DEVICE — ENDO TROCAR SLEEVE KII Z-THREADED 05X100MM CTS02

## (undated) DEVICE — SOL NACL 0.9% IRRIG 1000ML BOTTLE 2F7124

## (undated) DEVICE — SUCTION IRR STRYKERFLOW II W/TIP 250-070-520

## (undated) DEVICE — ENDO SNARE POLYPECTOMY OVAL 15MM LOOP SD-240U-15

## (undated) DEVICE — ESU GROUND PAD ADULT W/CORD E7507

## (undated) DEVICE — GLOVE PROTEXIS BLUE W/NEU-THERA 6.0  2D73EB60

## (undated) DEVICE — NDL INSUFFLATION 13GA 120MM C2201

## (undated) DEVICE — DEVICE SUTURE GRASPER TROCAR CLOSURE 14GA PMITCSG

## (undated) DEVICE — SUCTION CANISTER MEDIVAC LINER 3000ML W/LID 65651-530

## (undated) DEVICE — PACK TVT HYSTEROSCOPY SMA15HYFSE

## (undated) DEVICE — SU MONOCRYL 4-0 PS-2 18" UND Y496G

## (undated) DEVICE — SUCTION CANISTER BEMIS HI FLOW 006772-901

## (undated) DEVICE — GLOVE PROTEXIS MICRO 6.5  2D73PM65

## (undated) DEVICE — PREP CHLORAPREP 26ML TINTED ORANGE  260815

## (undated) DEVICE — CATH TRAY FOLEY SURESTEP 16FR WDRAIN BAG STLK LATEX A300316A

## (undated) DEVICE — EVAC SYSTEM CLEAR FLOW SC082500

## (undated) DEVICE — ESU LIGASURE LAPAROSCOPIC BLUNT TIP SEALER 5MMX37CM LF1837

## (undated) DEVICE — KIT PATIENT POSITIONING PIGAZZI LATEX FREE 40580

## (undated) DEVICE — ENDO SCOPE WARMER LF TM500

## (undated) DEVICE — ESU HOLDER LAP INST DISP PURPLE LONG 330MM H-PRO-330

## (undated) DEVICE — LINEN TOWEL PACK X5 5464

## (undated) DEVICE — ENDO TRAP POLYP QUICK CATCH 710201

## (undated) DEVICE — GLOVE PROTEXIS W/NEU-THERA 7.5  2D73TE75

## (undated) DEVICE — SU VICRYL 0 CT-2 27" J334H

## (undated) DEVICE — SOL NACL 0.9% IRRIG 3000ML BAG 2B7477

## (undated) DEVICE — CLIP HEMOSTASIS ASSURANCE W18 MM 00711885

## (undated) DEVICE — GLOVE PROTEXIS W/NEU-THERA 7.0  2D73TE70

## (undated) DEVICE — Device

## (undated) DEVICE — SOL WATER IRRIG 1000ML BOTTLE 2F7114

## (undated) DEVICE — KIT HYSTEROSCOPIC 7209827

## (undated) RX ORDER — DEXAMETHASONE SODIUM PHOSPHATE 4 MG/ML
INJECTION, SOLUTION INTRA-ARTICULAR; INTRALESIONAL; INTRAMUSCULAR; INTRAVENOUS; SOFT TISSUE
Status: DISPENSED
Start: 2019-06-28

## (undated) RX ORDER — LIDOCAINE HYDROCHLORIDE 20 MG/ML
INJECTION, SOLUTION EPIDURAL; INFILTRATION; INTRACAUDAL; PERINEURAL
Status: DISPENSED
Start: 2019-06-28

## (undated) RX ORDER — ONDANSETRON 2 MG/ML
INJECTION INTRAMUSCULAR; INTRAVENOUS
Status: DISPENSED
Start: 2019-06-28

## (undated) RX ORDER — PROPOFOL 10 MG/ML
INJECTION, EMULSION INTRAVENOUS
Status: DISPENSED
Start: 2019-06-28

## (undated) RX ORDER — ONDANSETRON 2 MG/ML
INJECTION INTRAMUSCULAR; INTRAVENOUS
Status: DISPENSED
Start: 2023-12-12

## (undated) RX ORDER — GLYCOPYRROLATE 0.2 MG/ML
INJECTION, SOLUTION INTRAMUSCULAR; INTRAVENOUS
Status: DISPENSED
Start: 2019-06-28

## (undated) RX ORDER — FENTANYL CITRATE 50 UG/ML
INJECTION, SOLUTION INTRAMUSCULAR; INTRAVENOUS
Status: DISPENSED
Start: 2018-08-20

## (undated) RX ORDER — KETOROLAC TROMETHAMINE 30 MG/ML
INJECTION, SOLUTION INTRAMUSCULAR; INTRAVENOUS
Status: DISPENSED
Start: 2019-06-28

## (undated) RX ORDER — ACETAMINOPHEN 325 MG/1
TABLET ORAL
Status: DISPENSED
Start: 2019-06-28

## (undated) RX ORDER — CEFAZOLIN SODIUM 2 G/100ML
INJECTION, SOLUTION INTRAVENOUS
Status: DISPENSED
Start: 2019-06-28

## (undated) RX ORDER — FENTANYL CITRATE 50 UG/ML
INJECTION, SOLUTION INTRAMUSCULAR; INTRAVENOUS
Status: DISPENSED
Start: 2023-12-12

## (undated) RX ORDER — HYDROMORPHONE HYDROCHLORIDE 1 MG/ML
INJECTION, SOLUTION INTRAMUSCULAR; INTRAVENOUS; SUBCUTANEOUS
Status: DISPENSED
Start: 2019-06-28

## (undated) RX ORDER — NEOSTIGMINE METHYLSULFATE 1 MG/ML
VIAL (ML) INJECTION
Status: DISPENSED
Start: 2019-06-28

## (undated) RX ORDER — SIMETHICONE 40MG/0.6ML
SUSPENSION, DROPS(FINAL DOSAGE FORM)(ML) ORAL
Status: DISPENSED
Start: 2023-12-12

## (undated) RX ORDER — FENTANYL CITRATE 50 UG/ML
INJECTION, SOLUTION INTRAMUSCULAR; INTRAVENOUS
Status: DISPENSED
Start: 2019-06-28